# Patient Record
Sex: MALE | Race: WHITE | NOT HISPANIC OR LATINO | Employment: OTHER | URBAN - METROPOLITAN AREA
[De-identification: names, ages, dates, MRNs, and addresses within clinical notes are randomized per-mention and may not be internally consistent; named-entity substitution may affect disease eponyms.]

---

## 2019-02-17 ENCOUNTER — HOSPITAL ENCOUNTER (OUTPATIENT)
Dept: URGENT CARE | Facility: CLINIC | Age: 24
Discharge: HOME | End: 2019-02-17

## 2021-09-16 ENCOUNTER — HOSPITAL ENCOUNTER (EMERGENCY)
Facility: HOSPITAL | Age: 26
Discharge: HOME | End: 2021-09-16
Attending: EMERGENCY MEDICINE | Admitting: EMERGENCY MEDICINE

## 2021-09-16 VITALS
SYSTOLIC BLOOD PRESSURE: 137 MMHG | DIASTOLIC BLOOD PRESSURE: 73 MMHG | TEMPERATURE: 98.9 F | OXYGEN SATURATION: 96 % | BODY MASS INDEX: 34.59 KG/M2 | HEIGHT: 70 IN | HEART RATE: 99 BPM | WEIGHT: 241.62 LBS | RESPIRATION RATE: 20 BRPM

## 2021-09-16 DIAGNOSIS — K52.9 COLITIS WITH RECTAL BLEEDING: Primary | ICD-10-CM

## 2021-09-16 DIAGNOSIS — K62.5 COLITIS WITH RECTAL BLEEDING: Primary | ICD-10-CM

## 2021-09-16 LAB
ALBUMIN SERPL-MCNC: 4 G/DL (ref 3.5–5.2)
ALBUMIN/GLOB SERPL: 1.6 G/DL
ALP SERPL-CCNC: 60 U/L (ref 39–117)
ALT SERPL W P-5'-P-CCNC: 93 U/L (ref 1–41)
ANION GAP SERPL CALCULATED.3IONS-SCNC: 13.2 MMOL/L (ref 5–15)
AST SERPL-CCNC: 55 U/L (ref 1–40)
BASOPHILS # BLD AUTO: 0.03 10*3/MM3 (ref 0–0.2)
BASOPHILS NFR BLD AUTO: 0.4 % (ref 0–1.5)
BILIRUB SERPL-MCNC: 0.5 MG/DL (ref 0–1.2)
BILIRUB UR QL STRIP: NEGATIVE
BUN SERPL-MCNC: 9 MG/DL (ref 6–20)
BUN/CREAT SERPL: 8.7 (ref 7–25)
CALCIUM SPEC-SCNC: 8.6 MG/DL (ref 8.6–10.5)
CHLORIDE SERPL-SCNC: 102 MMOL/L (ref 98–107)
CLARITY UR: CLEAR
CO2 SERPL-SCNC: 21.8 MMOL/L (ref 22–29)
COLOR UR: ABNORMAL
CREAT SERPL-MCNC: 1.04 MG/DL (ref 0.76–1.27)
DEPRECATED RDW RBC AUTO: 38.1 FL (ref 37–54)
EOSINOPHIL # BLD AUTO: 0.38 10*3/MM3 (ref 0–0.4)
EOSINOPHIL NFR BLD AUTO: 4.5 % (ref 0.3–6.2)
ERYTHROCYTE [DISTWIDTH] IN BLOOD BY AUTOMATED COUNT: 12.7 % (ref 12.3–15.4)
GFR SERPL CREATININE-BSD FRML MDRD: 85 ML/MIN/1.73
GLOBULIN UR ELPH-MCNC: 2.5 GM/DL
GLUCOSE SERPL-MCNC: 90 MG/DL (ref 65–99)
GLUCOSE UR STRIP-MCNC: NEGATIVE MG/DL
HCT VFR BLD AUTO: 40.5 % (ref 37.5–51)
HGB BLD-MCNC: 14 G/DL (ref 13–17.7)
HGB UR QL STRIP.AUTO: NEGATIVE
HOLD SPECIMEN: NORMAL
HOLD SPECIMEN: NORMAL
IMM GRANULOCYTES # BLD AUTO: 0.14 10*3/MM3 (ref 0–0.05)
IMM GRANULOCYTES NFR BLD AUTO: 1.7 % (ref 0–0.5)
KETONES UR QL STRIP: ABNORMAL
LEUKOCYTE ESTERASE UR QL STRIP.AUTO: NEGATIVE
LIPASE SERPL-CCNC: 21 U/L (ref 13–60)
LYMPHOCYTES # BLD AUTO: 1.84 10*3/MM3 (ref 0.7–3.1)
LYMPHOCYTES NFR BLD AUTO: 22 % (ref 19.6–45.3)
MCH RBC QN AUTO: 28.5 PG (ref 26.6–33)
MCHC RBC AUTO-ENTMCNC: 34.6 G/DL (ref 31.5–35.7)
MCV RBC AUTO: 82.3 FL (ref 79–97)
MONOCYTES # BLD AUTO: 1.04 10*3/MM3 (ref 0.1–0.9)
MONOCYTES NFR BLD AUTO: 12.4 % (ref 5–12)
NEUTROPHILS NFR BLD AUTO: 4.93 10*3/MM3 (ref 1.7–7)
NEUTROPHILS NFR BLD AUTO: 59 % (ref 42.7–76)
NITRITE UR QL STRIP: NEGATIVE
NRBC BLD AUTO-RTO: 0 /100 WBC (ref 0–0.2)
PH UR STRIP.AUTO: 6 [PH] (ref 5–8)
PLATELET # BLD AUTO: 270 10*3/MM3 (ref 140–450)
PMV BLD AUTO: 9.2 FL (ref 6–12)
POTASSIUM SERPL-SCNC: 3.5 MMOL/L (ref 3.5–5.2)
PROT SERPL-MCNC: 6.5 G/DL (ref 6–8.5)
PROT UR QL STRIP: ABNORMAL
RBC # BLD AUTO: 4.92 10*6/MM3 (ref 4.14–5.8)
SODIUM SERPL-SCNC: 137 MMOL/L (ref 136–145)
SP GR UR STRIP: 1.03 (ref 1–1.03)
UROBILINOGEN UR QL STRIP: ABNORMAL
WBC # BLD AUTO: 8.36 10*3/MM3 (ref 3.4–10.8)
WHOLE BLOOD HOLD SPECIMEN: NORMAL
WHOLE BLOOD HOLD SPECIMEN: NORMAL

## 2021-09-16 PROCEDURE — 85025 COMPLETE CBC W/AUTO DIFF WBC: CPT | Performed by: EMERGENCY MEDICINE

## 2021-09-16 PROCEDURE — 80053 COMPREHEN METABOLIC PANEL: CPT | Performed by: EMERGENCY MEDICINE

## 2021-09-16 PROCEDURE — 99283 EMERGENCY DEPT VISIT LOW MDM: CPT

## 2021-09-16 PROCEDURE — 83690 ASSAY OF LIPASE: CPT | Performed by: EMERGENCY MEDICINE

## 2021-09-16 PROCEDURE — 36415 COLL VENOUS BLD VENIPUNCTURE: CPT

## 2021-09-16 PROCEDURE — 81003 URINALYSIS AUTO W/O SCOPE: CPT | Performed by: EMERGENCY MEDICINE

## 2021-09-16 RX ORDER — CIPROFLOXACIN 500 MG/1
500 TABLET, FILM COATED ORAL 2 TIMES DAILY
Qty: 20 TABLET | Refills: 0 | Status: SHIPPED | OUTPATIENT
Start: 2021-09-16 | End: 2023-03-29

## 2021-09-16 RX ORDER — DICYCLOMINE HCL 20 MG
20 TABLET ORAL EVERY 6 HOURS
Qty: 20 TABLET | Refills: 0 | Status: SHIPPED | OUTPATIENT
Start: 2021-09-16 | End: 2023-03-29 | Stop reason: ALTCHOICE

## 2021-09-16 RX ORDER — PREDNISONE 20 MG/1
20 TABLET ORAL DAILY
Qty: 5 TABLET | Refills: 0 | Status: SHIPPED | OUTPATIENT
Start: 2021-09-16 | End: 2023-03-29

## 2021-09-16 RX ORDER — SODIUM CHLORIDE 0.9 % (FLUSH) 0.9 %
10 SYRINGE (ML) INJECTION AS NEEDED
Status: DISCONTINUED | OUTPATIENT
Start: 2021-09-16 | End: 2021-09-16 | Stop reason: HOSPADM

## 2021-09-16 NOTE — DISCHARGE INSTRUCTIONS
Continue Flagyl. If bleeding persists after course of medications please make appointment with gastroenterology for possible colonoscopy. Return to the ER for dizziness, shortness of air, palpitations, fever, increased pain or any concerns.

## 2021-09-16 NOTE — ED PROVIDER NOTES
Subjective   Pt reports he was seen by PCP yesterday for same issue of abdominal pain and rectal bleeding. A CT scan was performed at [facility] which pt reports showed diffuse colitis. He was given 2 liters of IVF and prescribed Flagyl. Today he has increased blood in diarrhea and his PCP advised he go to an ER for evaluation. Pt denies dizziness, shortness of breath. Reports no increase in abdominal pain, just increased bleeding. Pt also had recent illness with fatigue and was tested twice for COVID and each was negative.       History provided by:  Patient  Rectal Bleeding  Quality:  Bright red  Amount:  Moderate  Duration:  2 days  Timing:  Constant  Chronicity:  New  Context: diarrhea and spontaneously    Context: not anal fissures, not anal penetration, not constipation, not defecation, not foreign body, not hemorrhoids, not rectal injury and not rectal pain    Relieved by:  Nothing  Worsened by:  Nothing  Ineffective treatments: immodium.  Associated symptoms: abdominal pain and recent illness    Associated symptoms: no dizziness, no epistaxis, no fever, no hematemesis, no light-headedness, no loss of consciousness and no vomiting        Review of Systems   Constitutional: Negative for chills and fever.   HENT: Negative for congestion, ear pain, nosebleeds and sore throat.    Eyes: Negative for pain.   Respiratory: Negative for cough, chest tightness and shortness of breath.    Cardiovascular: Negative for chest pain.   Gastrointestinal: Positive for abdominal pain, blood in stool, diarrhea and hematochezia. Negative for abdominal distention, anal bleeding, constipation, hematemesis, nausea, rectal pain and vomiting.   Genitourinary: Negative for flank pain and hematuria.   Musculoskeletal: Negative for joint swelling.   Skin: Negative for pallor.   Neurological: Negative for dizziness, seizures, loss of consciousness, light-headedness and headaches.   All other systems reviewed and are  negative.      Past Medical History:   Diagnosis Date   • Allergies    • Anxiety    • Disease of thyroid gland    • Hypertension        Allergies   Allergen Reactions   • Vancomycin Other (See Comments)     Joint pain   • Augmentin [Amoxicillin-Pot Clavulanate] Rash       No past surgical history on file.    Family History   Problem Relation Age of Onset   • No Known Problems Mother    • Cancer Father        Social History     Socioeconomic History   • Marital status: Single     Spouse name: Not on file   • Number of children: Not on file   • Years of education: Not on file   • Highest education level: Not on file   Tobacco Use   • Smoking status: Never Smoker   • Smokeless tobacco: Never Used   Vaping Use   • Vaping Use: Never used   Substance and Sexual Activity   • Alcohol use: Not Currently     Comment: SOCIAL    • Drug use: Never   • Sexual activity: Defer           Objective   Physical Exam  Vitals and nursing note reviewed.   Constitutional:       General: He is not in acute distress.     Appearance: Normal appearance. He is not toxic-appearing.   HENT:      Head: Normocephalic and atraumatic.      Mouth/Throat:      Mouth: Mucous membranes are moist.   Eyes:      Extraocular Movements: Extraocular movements intact.      Pupils: Pupils are equal, round, and reactive to light.   Cardiovascular:      Rate and Rhythm: Normal rate and regular rhythm.      Pulses: Normal pulses.      Heart sounds: Normal heart sounds.   Pulmonary:      Effort: Pulmonary effort is normal. No respiratory distress.      Breath sounds: Normal breath sounds.   Abdominal:      General: Abdomen is flat.      Palpations: Abdomen is soft.      Tenderness: There is no abdominal tenderness.   Musculoskeletal:         General: Normal range of motion.      Cervical back: Normal range of motion and neck supple.   Skin:     General: Skin is warm and dry.   Neurological:      Mental Status: He is alert and oriented to person, place, and time.  Mental status is at baseline.         Procedures           ED Course                                           MDM  Number of Diagnoses or Management Options  Colitis with rectal bleeding: established and worsening  Diagnosis management comments: The patient is resting comfortably and feels better, is alert and in no distress. Repeat examination is unremarkable and benign; in particular, there's no discomfort at McBurney's point and there is no pulsatile mass. The patient has passed a po challenge in the ED and has no intractable vomiting. The history, exam, diagnostic testing, and current condition does not suggest acute appendicitis, bowel instruction, acute cholecystitis, bowel perforation, major gastrointestinal bleeding, severe diverticulitis, abdominal aortic aneurysm, mesenteric ischemia, volvulus, sepsis, or other significant pathology that warrants further testing, continued ED treatment, admission, for surgical evaluation at this point. The vital signs have been stable. The patient´s symptoms are consistent with gastritis. The patient has no peritoneal signs consistent with a perforated ulcer. The patient was counseled to avoid NSAIDS, coffee, spicy foods, alcohol, smoking and other irritants of the stomach. The patient was advised that they may seek  of a gastroenterologist for an outpatient endoscopy. The patient does not have uncontrollable pain, intractable vomiting, or other significant symptoms. The patient's condition is stable and appropriate for discharge from the emergency department.       Amount and/or Complexity of Data Reviewed  Clinical lab tests: reviewed and ordered    Risk of Complications, Morbidity, and/or Mortality  Presenting problems: low  Diagnostic procedures: low  Management options: low    Patient Progress  Patient progress: stable      Final diagnoses:   Colitis with rectal bleeding       ED Disposition  ED Disposition     ED Disposition Condition Comment    Discharge  Madai Case, DO  [address] [facility] [place] [address]  [phone]    In 3 days  As needed    Sean Quan MD  [address] [place] [address] [phone]    Schedule an appointment as soon as possible for a visit in 1 week           Medication List      New Prescriptions    ciprofloxacin 500 MG tablet  Commonly known as: CIPRO  Take 1 tablet by mouth 2 (Two) Times a Day.     dicyclomine 20 MG tablet  Commonly known as: BENTYL  Take 1 tablet by mouth Every 6 (Six) Hours.     predniSONE 20 MG tablet  Commonly known as: DELTASONE  Take 1 tablet by mouth Daily.           Where to Get Your Medications      These medications were sent to [id number] - [place], [place] - [address] AT [facility] - [phone]  - [phone] FX  [address] [place] [address]    Hours: 24-hours Phone: [phone]   · ciprofloxacin 500 MG tablet  · dicyclomine 20 MG tablet  · predniSONE 20 MG tablet          Kris Ceballos, APRN  09/17/21 0003

## 2022-12-29 ENCOUNTER — TRANSCRIBE ORDERS (OUTPATIENT)
Dept: LAB | Facility: HOSPITAL | Age: 27
End: 2022-12-29
Payer: COMMERCIAL

## 2022-12-29 DIAGNOSIS — R73.9 BLOOD GLUCOSE ELEVATED: ICD-10-CM

## 2022-12-29 DIAGNOSIS — R53.83 TIREDNESS: ICD-10-CM

## 2022-12-29 DIAGNOSIS — E55.9 AVITAMINOSIS D: Primary | ICD-10-CM

## 2022-12-29 DIAGNOSIS — I10 ESSENTIAL HYPERTENSION, MALIGNANT: ICD-10-CM

## 2023-01-13 ENCOUNTER — LAB (OUTPATIENT)
Dept: LAB | Facility: HOSPITAL | Age: 28
End: 2023-01-13
Payer: COMMERCIAL

## 2023-01-13 DIAGNOSIS — R73.9 BLOOD GLUCOSE ELEVATED: ICD-10-CM

## 2023-01-13 DIAGNOSIS — E55.9 AVITAMINOSIS D: ICD-10-CM

## 2023-01-13 DIAGNOSIS — I10 ESSENTIAL HYPERTENSION, MALIGNANT: ICD-10-CM

## 2023-01-13 DIAGNOSIS — R53.83 TIREDNESS: ICD-10-CM

## 2023-01-13 LAB
25(OH)D3 SERPL-MCNC: 25.9 NG/ML (ref 30–100)
ALBUMIN SERPL-MCNC: 4.8 G/DL (ref 3.5–5.2)
ALBUMIN UR-MCNC: <1.2 MG/DL
ALBUMIN/GLOB SERPL: 1.9 G/DL
ALP SERPL-CCNC: 67 U/L (ref 39–117)
ALT SERPL W P-5'-P-CCNC: 80 U/L (ref 1–41)
ANION GAP SERPL CALCULATED.3IONS-SCNC: 10.8 MMOL/L (ref 5–15)
AST SERPL-CCNC: 36 U/L (ref 1–40)
BASOPHILS # BLD AUTO: 0.08 10*3/MM3 (ref 0–0.2)
BASOPHILS NFR BLD AUTO: 1.2 % (ref 0–1.5)
BILIRUB SERPL-MCNC: 0.4 MG/DL (ref 0–1.2)
BILIRUB UR QL STRIP: NEGATIVE
BUN SERPL-MCNC: 17 MG/DL (ref 6–20)
BUN/CREAT SERPL: 17.2 (ref 7–25)
CALCIUM SPEC-SCNC: 9.1 MG/DL (ref 8.6–10.5)
CHLORIDE SERPL-SCNC: 100 MMOL/L (ref 98–107)
CHOLEST SERPL-MCNC: 210 MG/DL (ref 0–200)
CLARITY UR: CLEAR
CO2 SERPL-SCNC: 24.2 MMOL/L (ref 22–29)
COLOR UR: YELLOW
CORTIS AM PEAK SERPL-MCNC: 7.23 MCG/DL (ref 6.02–18.4)
CREAT SERPL-MCNC: 0.99 MG/DL (ref 0.76–1.27)
DEPRECATED RDW RBC AUTO: 41.1 FL (ref 37–54)
EGFRCR SERPLBLD CKD-EPI 2021: 105.8 ML/MIN/1.73
EOSINOPHIL # BLD AUTO: 0.28 10*3/MM3 (ref 0–0.4)
EOSINOPHIL NFR BLD AUTO: 4.2 % (ref 0.3–6.2)
ERYTHROCYTE [DISTWIDTH] IN BLOOD BY AUTOMATED COUNT: 13.6 % (ref 12.3–15.4)
FOLATE SERPL-MCNC: 3.96 NG/ML (ref 4.78–24.2)
GLOBULIN UR ELPH-MCNC: 2.5 GM/DL
GLUCOSE SERPL-MCNC: 105 MG/DL (ref 65–99)
GLUCOSE UR STRIP-MCNC: NEGATIVE MG/DL
HBA1C MFR BLD: 5.8 % (ref 4.8–5.6)
HCT VFR BLD AUTO: 42.9 % (ref 37.5–51)
HDLC SERPL-MCNC: 24 MG/DL (ref 40–60)
HGB BLD-MCNC: 14.8 G/DL (ref 13–17.7)
HGB UR QL STRIP.AUTO: NEGATIVE
IMM GRANULOCYTES # BLD AUTO: 0.04 10*3/MM3 (ref 0–0.05)
IMM GRANULOCYTES NFR BLD AUTO: 0.6 % (ref 0–0.5)
KETONES UR QL STRIP: NEGATIVE
LDLC SERPL CALC-MCNC: 81 MG/DL (ref 0–100)
LDLC/HDLC SERPL: 2.35 {RATIO}
LEUKOCYTE ESTERASE UR QL STRIP.AUTO: NEGATIVE
LYMPHOCYTES # BLD AUTO: 2.34 10*3/MM3 (ref 0.7–3.1)
LYMPHOCYTES NFR BLD AUTO: 35 % (ref 19.6–45.3)
MCH RBC QN AUTO: 28.8 PG (ref 26.6–33)
MCHC RBC AUTO-ENTMCNC: 34.5 G/DL (ref 31.5–35.7)
MCV RBC AUTO: 83.5 FL (ref 79–97)
MONOCYTES # BLD AUTO: 0.58 10*3/MM3 (ref 0.1–0.9)
MONOCYTES NFR BLD AUTO: 8.7 % (ref 5–12)
NEUTROPHILS NFR BLD AUTO: 3.37 10*3/MM3 (ref 1.7–7)
NEUTROPHILS NFR BLD AUTO: 50.3 % (ref 42.7–76)
NITRITE UR QL STRIP: NEGATIVE
NRBC BLD AUTO-RTO: 0 /100 WBC (ref 0–0.2)
PH UR STRIP.AUTO: 5.5 [PH] (ref 5–8)
PLATELET # BLD AUTO: 253 10*3/MM3 (ref 140–450)
PMV BLD AUTO: 10.6 FL (ref 6–12)
POTASSIUM SERPL-SCNC: 4.4 MMOL/L (ref 3.5–5.2)
PROT SERPL-MCNC: 7.3 G/DL (ref 6–8.5)
PROT UR QL STRIP: NEGATIVE
RBC # BLD AUTO: 5.14 10*6/MM3 (ref 4.14–5.8)
SODIUM SERPL-SCNC: 135 MMOL/L (ref 136–145)
SP GR UR STRIP: 1.02 (ref 1–1.03)
TRIGL SERPL-MCNC: 648 MG/DL (ref 0–150)
TSH SERPL DL<=0.05 MIU/L-ACNC: 4.88 UIU/ML (ref 0.27–4.2)
UROBILINOGEN UR QL STRIP: NORMAL
VIT B12 BLD-MCNC: 687 PG/ML (ref 211–946)
VLDLC SERPL-MCNC: 105 MG/DL (ref 5–40)
WBC NRBC COR # BLD: 6.69 10*3/MM3 (ref 3.4–10.8)

## 2023-01-13 PROCEDURE — 82627 DEHYDROEPIANDROSTERONE: CPT

## 2023-01-13 PROCEDURE — 81003 URINALYSIS AUTO W/O SCOPE: CPT

## 2023-01-13 PROCEDURE — 82043 UR ALBUMIN QUANTITATIVE: CPT

## 2023-01-13 PROCEDURE — 83036 HEMOGLOBIN GLYCOSYLATED A1C: CPT

## 2023-01-13 PROCEDURE — 82607 VITAMIN B-12: CPT

## 2023-01-13 PROCEDURE — 80061 LIPID PANEL: CPT

## 2023-01-13 PROCEDURE — 80050 GENERAL HEALTH PANEL: CPT

## 2023-01-13 PROCEDURE — 84403 ASSAY OF TOTAL TESTOSTERONE: CPT

## 2023-01-13 PROCEDURE — 82533 TOTAL CORTISOL: CPT

## 2023-01-13 PROCEDURE — 84402 ASSAY OF FREE TESTOSTERONE: CPT

## 2023-01-13 PROCEDURE — 82746 ASSAY OF FOLIC ACID SERUM: CPT

## 2023-01-13 PROCEDURE — 36415 COLL VENOUS BLD VENIPUNCTURE: CPT

## 2023-01-13 PROCEDURE — 82306 VITAMIN D 25 HYDROXY: CPT

## 2023-01-14 LAB — DHEA-S SERPL-MCNC: 344 UG/DL (ref 138.5–475.2)

## 2023-01-18 LAB
TESTOST FREE SERPL-MCNC: 5.9 PG/ML (ref 9.3–26.5)
TESTOST SERPL-MCNC: 126 NG/DL (ref 264–916)

## 2023-03-29 ENCOUNTER — OFFICE VISIT (OUTPATIENT)
Dept: SLEEP MEDICINE | Facility: HOSPITAL | Age: 28
End: 2023-03-29
Payer: COMMERCIAL

## 2023-03-29 VITALS
HEART RATE: 74 BPM | OXYGEN SATURATION: 96 % | BODY MASS INDEX: 36.28 KG/M2 | WEIGHT: 253.4 LBS | DIASTOLIC BLOOD PRESSURE: 74 MMHG | HEIGHT: 70 IN | SYSTOLIC BLOOD PRESSURE: 131 MMHG

## 2023-03-29 DIAGNOSIS — G47.8 NON-RESTORATIVE SLEEP: ICD-10-CM

## 2023-03-29 DIAGNOSIS — E66.9 CLASS 2 OBESITY: ICD-10-CM

## 2023-03-29 DIAGNOSIS — G47.30 OBSERVED SLEEP APNEA: Primary | ICD-10-CM

## 2023-03-29 DIAGNOSIS — R06.83 SNORING: ICD-10-CM

## 2023-03-29 DIAGNOSIS — G47.10 HYPERSOMNIA: ICD-10-CM

## 2023-03-29 PROBLEM — E66.812 CLASS 2 OBESITY: Status: ACTIVE | Noted: 2023-03-29

## 2023-03-29 PROCEDURE — G0463 HOSPITAL OUTPT CLINIC VISIT: HCPCS

## 2023-03-29 PROCEDURE — 99204 OFFICE O/P NEW MOD 45 MIN: CPT | Performed by: INTERNAL MEDICINE

## 2023-03-29 RX ORDER — CLOMIPHENE CITRATE 50 MG/1
TABLET ORAL
COMMUNITY
Start: 2023-02-15

## 2023-03-29 RX ORDER — ICOSAPENT ETHYL 1000 MG/1
CAPSULE ORAL
COMMUNITY
Start: 2023-01-16

## 2023-03-29 RX ORDER — SEMAGLUTIDE 1.34 MG/ML
INJECTION, SOLUTION SUBCUTANEOUS
COMMUNITY
Start: 2023-02-01

## 2023-03-29 RX ORDER — LEVOTHYROXINE SODIUM 0.03 MG/1
TABLET ORAL
COMMUNITY
Start: 2023-01-16

## 2023-03-29 NOTE — PROGRESS NOTES
[facility]  [address] [place] [address]  [place]   [place] [address]  Phone: [phone]  Fax: [phone]      Tae Mathews  [id number]   1995  27 y.o.  male      Referring physician/provider and PCP Madai Quinones DO    Type of service: Initial Sleep Medicine Consult.  Date of service: 3/29/2023      No chief complaint on file.      History of present illness;  Thank you for asking to see Tae Mathews, 27 y.o.. The patient was seen today on 3/29/2023 at [facility].  The patient presents today with symptoms of snoring, non-restorative sleep and witnessed apneas. The symptoms are present for 3 to 4 years and they are persistent in nature.  The snoring is present in all positions and it is loud.  Patient has no prior surgery namely tonsillectomy, nasal surgery and UPPP.     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    Patient gives the following sleep history.  Sleep schedule:  Bedtime: 11 PM  Wake time: 7 AM  Normally takes about 30-60 minutes to fall asleep  Average hours of sleep 8  Number of naps per day 1  Symptoms   In addition to snoring, nonrestorative sleep and witnessed apneas patient gives the following associated symptoms.  Have you ever awakened gasping for breath, coughing, choking: Yes   Change in weight,  Yes gained 40 pounds  Morning headaches  No   Awaken with a sore throat or dry mouth  Yes   Leg jerking at night:  No   Crawly feeling/urge sensation to move in the legs: No   Teeth grinding:No   Have you ever awakened at night with a sour taste or burning sensation in your chest:  No   Do you have muscle weakness with laughing or anger or sleep paralysis:  No   Have you ever felt paralyzed while going to sleep or waking up:  No   Sleepwalking, nightmares, No   Nocturia (urination at night): 0 times per night  Memory Problem:No     MEDICAL CONDITIONS (PMH)   1. Hypertension  2. Diabetes  3. Hypothyroidism    Social history:  Do you drive a  "commercial vehicle:  No   Shift work:  No   Tobacco use:  No   Alcohol use: 0 per week  Caffeinated drinks: 2    Family Hx (parents and siblings) (pertaining to sleep medicine)  1. Thyroid disorder  2. Obesity    Medications: reviewed    Review of systems:  Positive symptoms are :  • Snoring  • Witnessed apnea  • Daytime excessive sleepiness with [place] Sleepiness Scale of Total score: 14   • Fatigue  • Nasal congestion  • Recurrent heartburn      Physical exam:  CONSTITUTINONAL:  Vitals:    03/29/23 0900   BP: 131/74   Pulse: 74   SpO2: 96%   Weight: 115 kg (253 lb 6.4 oz)   Height: 177.8 cm (70\")    Body mass index is 36.36 kg/m².   NOSE:no nasal septal defects, nasal passages are clear, no nasal polyps,   THROAT: tonsils are nonenlarged, tongue normal size, oral airway Mallampati class 3  NECK:Neck Circumference: 16 inches, trachea is in the midline, thyroid not enlarged  RESPIRATORY SYSTEM: Breath sounds are normal, there are no wheezes  CARDIOVASULAR SYSTEM: Heart sounds are regular rhythm and normal rate, no edema  NEUROLOGICAL SYSTEM: Oriented x 3, No speech defect, gait is normal  PSYCHIATRIC SYSTEM: Mood is normal, thought content is normal      Labs reviewed.  TSH Results:  TSH    TSH 1/13/23   TSH 4.880 (A)   (A) Abnormal value             Most Recent A1C    HGBA1C Most Recent 1/13/23   Hemoglobin A1C 5.80 (A)   (A) Abnormal value               Assessment and plan:  · Witnessed apneas,(R06.81) patient's symptoms and examination is consistent with sleep apnea (G47.30). I have talked to the patient about the signs and symptoms of sleep apnea. In addition, I have also discussed pathophysiology of sleep apnea.  I also discussed the complications of untreated sleep apnea including effects on hypertension, diabetes mellitus and nonrestorative sleep with hypersomnia which can increase risk for motor vehicle accidents.  Untreated sleep apnea is also a risk factor for development of atrial fibrillation, pulmonary " hypertension, and insulin resistance and stroke.  Discussed in detail of various testing methods including home-based and lab based sleep studies.  Based on history and physical examination and other comorbidities the most appropriate study is home sleep test.  The order for the sleep study is placed in [facility].  The test will be scheduled after approval from insurance. Treatment and management will be discussed after the test is completed.  Patient was given opportunity to ask questions and all the questions were answered.   · Snoring (R06.83), snoring is the sound created by turbulent airflow vibrating upper airway soft tissue due to limitation of inspiratory airflow. I have also discussed factors affecting snoring including sleep deprivation, sleeping on the back and alcohol ingestion. To minimize snoring, patient is advised to have adequate sleep, sleep on the side and avoid alcohol and sedative medications before bedtime  · Daytime excessive sleepiness .  It was assessed with [place] Sleepiness Scale of Total score: 14.  There are many causes for daytime excessive sleepiness including sleep depression, shiftwork syndrome, depression and other medical disorders including heart, kidney and liver failure.  The most serious cause of excessive sleepiness is due to neurological conditions like narcolepsy/cataplexy.  But the most common cause of excessive sleepiness is due to sleep apnea with frequent awakenings during sleep time.  I have discussed safety of driving and to remain vigilant while driving.  · Obesity 2, patient's BMI is Body mass index is 36.36 kg/m².. I have discussed the relationship between weight and sleep apnea.There is direct correlation between weight and severity of sleep apnea.  Weight reduction is encouraged, as it is going to reduce the severity of sleep apnea. I have also discussed with the patient diet and exercise to achieve ideal body weight.  · Hypertension    I have also discussed with the  patient the following  • Sleep hygiene: Maintaining a regular bedtime and wake time, not to watch television or work in bed, limit caffeine-containing beverages before bed time and avoid naps during the day  • Adequate amount of sleep.  Generally most people needs about 7 to 8 hours of sleep.    Return for 31 to 90 days after PAP setup with down load..  Patient's questions were answered      I once again thank you for asking me to see this patient in consultation and I have forwarded my opinion and treatment plan.  Please do not hesitate to call me if you have any questions.   3/29/2023  Autumn Hinson MD  Sleep Medicine  Medical Director  [facility]: [facility]

## 2023-04-03 ENCOUNTER — TRANSCRIBE ORDERS (OUTPATIENT)
Dept: LAB | Facility: HOSPITAL | Age: 28
End: 2023-04-03
Payer: COMMERCIAL

## 2023-04-03 DIAGNOSIS — I10 ESSENTIAL (PRIMARY) HYPERTENSION: ICD-10-CM

## 2023-04-03 DIAGNOSIS — R73.9 HYPERGLYCEMIA: Primary | ICD-10-CM

## 2023-04-03 DIAGNOSIS — E55.9 VITAMIN D DEFICIENCY: ICD-10-CM

## 2023-04-03 DIAGNOSIS — R53.83 OTHER FATIGUE: ICD-10-CM

## 2023-05-02 DIAGNOSIS — G47.30 OBSERVED SLEEP APNEA: Primary | ICD-10-CM

## 2023-05-02 DIAGNOSIS — E66.9 CLASS 2 OBESITY: ICD-10-CM

## 2023-05-02 DIAGNOSIS — G47.10 HYPERSOMNIA: ICD-10-CM

## 2023-05-02 DIAGNOSIS — R06.83 SNORING: ICD-10-CM

## 2023-05-02 DIAGNOSIS — G47.8 NON-RESTORATIVE SLEEP: ICD-10-CM

## 2023-05-16 ENCOUNTER — TRANSCRIBE ORDERS (OUTPATIENT)
Dept: LAB | Facility: HOSPITAL | Age: 28
End: 2023-05-16
Payer: COMMERCIAL

## 2023-05-16 DIAGNOSIS — E29.1 3-OXO-5 ALPHA-STEROID DELTA 4-DEHYDROGENASE DEFICIENCY: Primary | ICD-10-CM

## 2023-05-19 ENCOUNTER — OFFICE VISIT (OUTPATIENT)
Dept: PSYCHIATRY | Facility: CLINIC | Age: 28
End: 2023-05-19
Payer: COMMERCIAL

## 2023-05-19 VITALS
DIASTOLIC BLOOD PRESSURE: 75 MMHG | HEIGHT: 70 IN | WEIGHT: 250.2 LBS | BODY MASS INDEX: 35.82 KG/M2 | HEART RATE: 76 BPM | SYSTOLIC BLOOD PRESSURE: 144 MMHG

## 2023-05-19 DIAGNOSIS — F33.1 MAJOR DEPRESSIVE DISORDER, RECURRENT EPISODE, MODERATE: Primary | ICD-10-CM

## 2023-05-19 DIAGNOSIS — F41.1 GENERALIZED ANXIETY DISORDER: ICD-10-CM

## 2023-05-19 PROBLEM — F41.9 ANXIETY: Status: ACTIVE | Noted: 2020-09-01

## 2023-05-19 PROBLEM — E78.5 HYPERLIPIDEMIA: Status: ACTIVE | Noted: 2022-12-28

## 2023-05-19 RX ORDER — PHENTERMINE HYDROCHLORIDE 15 MG/1
CAPSULE ORAL EVERY 24 HOURS
COMMUNITY

## 2023-05-19 RX ORDER — LEVOTHYROXINE SODIUM 0.2 MG/1
TABLET ORAL EVERY 24 HOURS
COMMUNITY

## 2023-05-19 RX ORDER — CITALOPRAM 20 MG/1
30 TABLET ORAL DAILY
Qty: 45 TABLET | Refills: 2 | Status: SHIPPED | OUTPATIENT
Start: 2023-05-19 | End: 2023-08-17

## 2023-05-19 NOTE — PROGRESS NOTES
"Subjective   Tae Mathews is a 27 y.o. male who presents today for initial evaluation   This provider is located at [address] [place] [address], [address] in [place], [place]. In-person visit consisting of the patient and I only. The patient is accepting of and agreeable to this appointment.       Chief Complaint:  Depression, anxiety    History of Present Illness: Depression: onset October 2020 (COVID, recently , moved), increased early 2023 (\"amount of stress I put on myself\")  Depressed mood: endorses  Markedly diminished interest or pleasure: endorses  Significant weight loss when not dieting or weight gain, or decrease or increase in appetite: n  Insomnia or hypersomnia: endorses insomnia  Psychomotor agitation or retardation: n  Fatigue or loss of energy: endorses   Feelings of worthlessness or excessive or inappropriate guilt: endorses   Diminished ability to think or concentrate, or indecisiveness: endorses  Recurrent thoughts of death, recurrent suicidal ideation without a specific plan, or suicide attempt or specific plan for committing suicide- denies    Anxiety: Onset October 2020, increased early 2023 (see above)  Anxious, nervous or worried on most days about a number of events or activities- endorses   No control over worries- endorses. \"ruminating. Doesn't take long to find a through z worst in things\"- working on positive coping skills  Irritability- denies  Fatigue: see above  Difficulty concentrating- see above  Sleep disturbance- see above  Restlessness- denies  Tension in muscles- denies    Psychiatric Review of Systems: Patient denies any current or previous hallucinations/delusions, paranoia, manic symptoms or PTSD.     PHQ-9 Depression Screening  PHQ-9 Total Score:      Little interest or pleasure in doing things?     Feeling down, depressed, or hopeless?     Trouble falling or staying asleep, or sleeping too much?     Feeling tired or having little energy?     Poor appetite or " overeating?     Feeling bad about yourself - or that you are a failure or have let yourself or your family down?     Trouble concentrating on things, such as reading the newspaper or watching television?     Moving or speaking so slowly that other people could have noticed? Or the opposite - being so fidgety or restless that you have been moving around a lot more than usual?     Thoughts that you would be better off dead, or of hurting yourself in some way?     PHQ-9 Total Score       PEPE-7  Feeling nervous, anxious or on edge: Nearly every day  Not being able to stop or control worrying: More than half the days  Worrying too much about different things: Nearly every day  Trouble Relaxing: Several days  Being so restless that it is hard to sit still: Not at all  Feeling afraid as if something awful might happen: More than half the days  Becoming easily annoyed or irritable: Several days  PEPE 7 Total Score: 12  If you checked any problems, how difficult have these problems made it for you to do your work, take care of things at home, or get along with other people: Somewhat difficult      Past Surgical History:  Past Surgical History:   Procedure Laterality Date   • COLONOSCOPY  11/03/2021       Problem List:  Patient Active Problem List   Diagnosis   • Observed sleep apnea   • Snoring   • Class 2 obesity   • Non-restorative sleep   • Hypersomnia   • Anxiety   • Hyperlipidemia   • High blood pressure   • Hypothyroidism       Allergy:   Allergies   Allergen Reactions   • Vancomycin Other (See Comments)     Joint pain   • Augmentin [Amoxicillin-Pot Clavulanate] Rash        Discontinued Medications:  Medications Discontinued During This Encounter   Medication Reason   • citalopram (CeleXA) 10 MG tablet Reorder       Current Medications:   Current Outpatient Medications   Medication Sig Dispense Refill   • citalopram (CeleXA) 20 MG tablet Take 1.5 tablets by mouth Daily for 90 days. 45 tablet 2   • cetirizine (zyrTEC) 10  MG tablet Take 1 tablet by mouth Daily.     • clomiPHENE (Clomid) 50 MG tablet      • levothyroxine (SYNTHROID, LEVOTHROID) 200 MCG tablet Take 1 tablet by mouth Daily.     • levothyroxine (SYNTHROID, LEVOTHROID) 200 MCG tablet Daily.     • levothyroxine (SYNTHROID, LEVOTHROID) 25 MCG tablet      • lisinopril (PRINIVIL,ZESTRIL) 20 MG tablet Take 1 tablet by mouth Daily.     • phentermine 15 MG capsule Daily.     • Semaglutide,0.25 or 0.5MG/DOS, (Ozempic, 0.25 or 0.5 MG/DOSE,) 2 MG/1.5ML solution pen-injector      • Vascepa 1 g capsule capsule        No current facility-administered medications for this visit.       Past Medical History:  Past Medical History:   Diagnosis Date   • Acid reflux    • Allergies    • Anxiety    • Depression    • Disease of thyroid gland    • Hypertension        Past Psychiatric History:  Began Treatment: 2020  Diagnoses: Depression, anxiety  Psychiatrist: Lópezies  Therapist: Dr. Phelps  Admission History: Denies  Medication Trials: Lexapro  Self Harm: Denies  Suicide Attempts: Denies    Substance Abuse History:   Types: Denies  Withdrawal Symptoms: Not applicable  Longest Period Sober: Not applicable  AA: Not applicable    Social History:  Martial Status:  (three years). [place]  Employed: [employer]  Kids: Denies  House: with wife   History: Denies    Social History     Socioeconomic History   • Marital status:    Tobacco Use   • Smoking status: Never   • Smokeless tobacco: Never   Vaping Use   • Vaping Use: Never used   Substance and Sexual Activity   • Alcohol use: Not Currently     Comment: SOCIAL    • Drug use: Never   • Sexual activity: Yes     Partners: Female       Family History:   Suicide Attempts: Denies  Suicide Completions: Denies      Family History   Problem Relation Age of Onset   • Anxiety disorder Mother    • Depression Mother    • Cancer Father    • Suicide Attempts Maternal Grandfather         committed suicide, 's  "Day 2020   • Anxiety disorder Maternal Grandmother    • Dementia Maternal Grandmother    • Depression Maternal Grandmother    • Seizures Maternal Grandmother        Developmental History:   Born: [place]  Siblings: Multiple  Childhood: Denies  High School: Graduate  College: Graduate    Access to Firearms: Denies    Mental Status Exam:     Appearance: good eye contact, normal street clothes, groomed, sitting in chair   Behavior: pleasant and cooperative  Motor: no abnormal  Speech: normal rhythm, rate, volume, tone, not hyperverbal, not pressured, normal prosidy  Mood: \"Okay\"  Affect: euthymic  Thought Content: negative suicidal ideations, negative homicidal ideations, negative obsessions  Perceptions: negative auditory hallucinations, negative visual hallucinations, negative delusions, negative paranoia  Thought Process: goal directed, linear  Insight/Judgement: fair/fair  Cognition: grossly intact  Attention: intact  Orientation: person, place, time and situation  Memory: intact    Review of Systems:     Constitutional: Denies fatigue, night sweats  Eyes: Denies double vision, blurred vision  HENT: Denies vertigo, recent head injury  Cardiovascular: Denies chest pain, irregular heartbeats  Respiratory: Denies productive cough, shortness of breath  Gastrointestinal: Denies nausea, vomiting  Genitourinary: Denies dysuria, urinary retention  Integument: Denies hair growth change, new skin lesions  Neurologic: Denies altered mental status, seizures  Musculoskeletal: Denies joint swelling, limitation of motion  Endocrine: Denies cold intolerance, heat intolerance  Psychiatric: Admits anxiety, depression. Denies yesy, post-traumatic stress disorder, obsessive compulsive disorder, psychosis.   Allergic-immunologic: Denies frequent illnesses      Vital Signs:   /75   Pulse 76   Ht 177.8 cm (70\")   Wt 113 kg (250 lb 3.2 oz)   BMI 35.90 kg/m²      Lab Results:   Lab on 01/13/2023   Component Date Value Ref Range " Status   • 25 Hydroxy, Vitamin D 01/13/2023 25.9 (L)  30.0 - 100.0 ng/ml Final   • Testosterone, Total 01/13/2023 126 (L)  264 - 916 ng/dL Final    Adult male reference interval is based on a population of  healthy nonobese males (BMI <30) between 19 and 39 years old.  Marilou et.al. JCEM 2017,102;8309-6619. PMID: [id number].   • Testosterone, Free 01/13/2023 5.9 (L)  9.3 - 26.5 pg/mL Final   • Vitamin B-12 01/13/2023 687  211 - 946 pg/mL Final   • Folate 01/13/2023 3.96 (L)  4.78 - 24.20 ng/mL Final   • Cortisol - AM 01/13/2023 7.23  6.02 - 18.40 mcg/dL Final   • DHEA-Sulfate 01/13/2023 344.0  138.5 - 475.2 ug/dL Final   • Glucose 01/13/2023 105 (H)  65 - 99 mg/dL Final   • BUN 01/13/2023 17  6 - 20 mg/dL Final   • Creatinine 01/13/2023 0.99  0.76 - 1.27 mg/dL Final   • Sodium 01/13/2023 135 (L)  136 - 145 mmol/L Final   • Potassium 01/13/2023 4.4  3.5 - 5.2 mmol/L Final   • Chloride 01/13/2023 100  98 - 107 mmol/L Final   • CO2 01/13/2023 24.2  22.0 - 29.0 mmol/L Final   • Calcium 01/13/2023 9.1  8.6 - 10.5 mg/dL Final   • Total Protein 01/13/2023 7.3  6.0 - 8.5 g/dL Final   • Albumin 01/13/2023 4.8  3.5 - 5.2 g/dL Final   • ALT (SGPT) 01/13/2023 80 (H)  1 - 41 U/L Final   • AST (SGOT) 01/13/2023 36  1 - 40 U/L Final   • Alkaline Phosphatase 01/13/2023 67  39 - 117 U/L Final   • Total Bilirubin 01/13/2023 0.4  0.0 - 1.2 mg/dL Final   • Globulin 01/13/2023 2.5  gm/dL Final   • A/G Ratio 01/13/2023 1.9  g/dL Final   • BUN/Creatinine Ratio 01/13/2023 17.2  7.0 - 25.0 Final   • Anion Gap 01/13/2023 10.8  5.0 - 15.0 mmol/L Final   • eGFR 01/13/2023 105.8  >60.0 mL/min/1.73 Final    National Kidney Foundation and American Society of Nephrology (ASN) Task Force recommended calculation based on the Chronic Kidney Disease Epidemiology Collaboration (CKD-EPI) equation refit without adjustment for race.   • Total Cholesterol 01/13/2023 210 (H)  0 - 200 mg/dL Final   • Triglycerides 01/13/2023 648 (H)  0 - 150 mg/dL Final   •  HDL Cholesterol 01/13/2023 24 (L)  40 - 60 mg/dL Final   • LDL Cholesterol  01/13/2023 81  0 - 100 mg/dL Final   • VLDL Cholesterol 01/13/2023 105 (H)  5 - 40 mg/dL Final   • LDL/HDL Ratio 01/13/2023 2.35   Final   • TSH 01/13/2023 4.880 (H)  0.270 - 4.200 uIU/mL Final   • Hemoglobin A1C 01/13/2023 5.80 (H)  4.80 - 5.60 % Final   • WBC 01/13/2023 6.69  3.40 - 10.80 10*3/mm3 Final   • RBC 01/13/2023 5.14  4.14 - 5.80 10*6/mm3 Final   • Hemoglobin 01/13/2023 14.8  13.0 - 17.7 g/dL Final   • Hematocrit 01/13/2023 42.9  37.5 - 51.0 % Final   • MCV 01/13/2023 83.5  79.0 - 97.0 fL Final   • MCH 01/13/2023 28.8  26.6 - 33.0 pg Final   • MCHC 01/13/2023 34.5  31.5 - 35.7 g/dL Final   • RDW 01/13/2023 13.6  12.3 - 15.4 % Final   • RDW-SD 01/13/2023 41.1  37.0 - 54.0 fl Final   • MPV 01/13/2023 10.6  6.0 - 12.0 fL Final   • Platelets 01/13/2023 253  140 - 450 10*3/mm3 Final   • Neutrophil % 01/13/2023 50.3  42.7 - 76.0 % Final   • Lymphocyte % 01/13/2023 35.0  19.6 - 45.3 % Final   • Monocyte % 01/13/2023 8.7  5.0 - 12.0 % Final   • Eosinophil % 01/13/2023 4.2  0.3 - 6.2 % Final   • Basophil % 01/13/2023 1.2  0.0 - 1.5 % Final   • Immature Grans % 01/13/2023 0.6 (H)  0.0 - 0.5 % Final   • Neutrophils, Absolute 01/13/2023 3.37  1.70 - 7.00 10*3/mm3 Final   • Lymphocytes, Absolute 01/13/2023 2.34  0.70 - 3.10 10*3/mm3 Final   • Monocytes, Absolute 01/13/2023 0.58  0.10 - 0.90 10*3/mm3 Final   • Eosinophils, Absolute 01/13/2023 0.28  0.00 - 0.40 10*3/mm3 Final   • Basophils, Absolute 01/13/2023 0.08  0.00 - 0.20 10*3/mm3 Final   • Immature Grans, Absolute 01/13/2023 0.04  0.00 - 0.05 10*3/mm3 Final   • nRBC 01/13/2023 0.0  0.0 - 0.2 /100 WBC Final   • Color, UA 01/13/2023 Yellow  Yellow, Straw Final   • Appearance, UA 01/13/2023 Clear  Clear Final   • pH, UA 01/13/2023 5.5  5.0 - 8.0 Final   • Specific Gravity, UA 01/13/2023 1.025  1.005 - 1.030 Final   • Glucose, UA 01/13/2023 Negative  Negative Final   • Ketones, UA 01/13/2023  Negative  Negative Final   • Bilirubin, UA 01/13/2023 Negative  Negative Final   • Blood, UA 01/13/2023 Negative  Negative Final   • Protein, UA 01/13/2023 Negative  Negative Final   • Leuk Esterase, UA 01/13/2023 Negative  Negative Final   • Nitrite, UA 01/13/2023 Negative  Negative Final   • Urobilinogen, UA 01/13/2023 0.2 E.U./dL  0.2 - 1.0 E.U./dL Final   • Microalbumin, Urine 01/13/2023 <1.2  mg/dL Final       EKG Results:  No orders to display       Imaging Results:  No Images in the past 120 days found..      Assessment & Plan   Diagnoses and all orders for this visit:    1. Major depressive disorder, recurrent episode, moderate (Primary)  -     citalopram (CeleXA) 20 MG tablet; Take 1.5 tablets by mouth Daily for 90 days.  Dispense: 45 tablet; Refill: 2    2. Generalized anxiety disorder      Presents with history of depression and anxiety. Increase citalopram to target depression and anxiety. 16 minutes of supportive psychotherapy with goal to strengthen defenses, promote problems solving, restore adaptive functioning and provide symptom relief. The therapeutic alliance was strengthened to encourage the patient to express their thoughts and feelings. Esteem building was enhanced through praise, reassurance, normalizing and encouragement. Coping skills were enhanced to build distress tolerance skills and emotional regulation. Allowed patient to freely discuss issues without interruption or judgement with unconditional positive regard, active listening skills, and empathy. Provided a safe, confidential environment to facilitate the development of a positive therapeutic relationship and encourage open, honest communication. Assisted patient in identifying risk factors which would indicate the need for higher level of care including thoughts to harm self or others and/or self-harming behavior and encouraged patient to contact this office, call 911, or present to the nearest emergency room should any of these  events occur. Assisted patient in processing session content; acknowledged and normalized patient's thoughts, feelings, and concerns by utilizing a person-centered approach in efforts to build appropriate rapport and a positive therapeutic relationship with open and honest communication. Patient given education on medication side effects, diagnosis/illness and relapse symptoms. Plan to continue supportive psychotherapy in next appointment to provide symptom relief. 4 weeks    Diagnoses: as above  Symptoms: as above  Functional status: good  Mental Status Exam: as above     Treatment plan: medication management and supportive psychotherapy  Prognosis: good  Progress: initial visit    Visit Diagnoses:    ICD-10-CM ICD-9-CM   1. Major depressive disorder, recurrent episode, moderate  F33.1 296.32   2. Generalized anxiety disorder  F41.1 300.02       PLAN:  1. Safety: No acute safety concerns.   2. Therapy: Declines  3. Risk Assessment: Risk of self-harm acutely is moderate.  Risk factors include anxiety disorder, mood disorder, and recent psychosocial stressors (pandemic). Protective factors include no family history, denies access to guns/weapons, no present SI, no history of suicide attempts or self-harm in the past, minimal AODA, healthcare seeking, future orientation, willingness to engage in care.  Risk of self-harm chronically is also moderate, but could be further elevated in the event of treatment noncompliance and/or AODA.  4. Medications: Increase citalopram 20mg to 30mg po qday to target depression and anxiety. Risks, benefits, alternatives discussed with patient including GI upset, nausea vomiting diarrhea, theoretical decrease of seizure threshold predisposing the patient to a slightly higher seizure risk, headaches, sexual dysfunction, serotonin syndrome, bleeding risk, increased suicidality in patients 24 years and younger, switching to yesy/hypomania.  After discussion of these risks and benefits, the  patient voiced understanding and agreed to proceed.  5. Labs/studies: No labs/studies ordered at this time  6. Follow-up: 4 weeks    Patient screened positive for depression based on a PHQ-9 score of  on . Follow-up recommendations include: Suicide Risk Assessment performed.         TREATMENT PLAN/GOALS: Continue supportive psychotherapy efforts and medications as indicated. Treatment and medication options discussed during today's visit. Patient ackowledged and verbally consented to continue with current treatment plan and was educated on the importance of compliance with treatment and follow-up appointments.      MEDICATION ISSUES:  WILIAN reviewed as expected.  Discussed medication options and treatment plan of prescribed medication as well as the risks, benefits, and side effects including potential falls, possible impaired driving and metabolic adversities among others. Patient is agreeable to call the office with any worsening of symptoms or onset of side effects. Patient is agreeable to call 911 or go to the nearest ER should he/she begin having SI/HI. No medication side effects or related complaints today.     MEDS ORDERED DURING VISIT:  New Medications Ordered This Visit   Medications   • citalopram (CeleXA) 20 MG tablet     Sig: Take 1.5 tablets by mouth Daily for 90 days.     Dispense:  45 tablet     Refill:  2       Return in about 4 weeks (around 6/16/2023) for Next scheduled follow up.         This document has been electronically signed by GEOVANNI Mariee  May 19, 2023 15:42 EDT      Part of this note may be an electronic transcription/translation of spoken language to printed text using the Dragon Dictation System.

## 2023-05-19 NOTE — TREATMENT PLAN
Multi-Disciplinary Problems (from Behavioral Health Treatment Plan)    Active Problems     Problem: Anxiety  Start Date: 05/19/23    Problem Details: The patient self-scales this problem as a 5 with 10 being the worst.        Goal Priority Start Date Expected End Date End Date    Patient will develop and implement behavioral and cognitive strategies to reduce anxiety and irrational fears. -- 05/19/23 -- --    Goal Details: Progress toward goal:  Not appropriate to rate progress toward goal since this is the initial treatment plan.        Goal Intervention Frequency Start Date End Date    Help patient explore past emotional issues in relation to present anxiety. Weekly 05/19/23 --    Intervention Details: Duration of treatment until until remission of symptoms.        Goal Intervention Frequency Start Date End Date    Help patient develop an awareness of their cognitive and physical responses to anxiety. Weekly 05/19/23 --    Intervention Details: Duration of treatment until until remission of symptoms.              Problem: Depression  Start Date: 05/19/23    Problem Details: The patient self-scales this problem as a 5 with 10 being the worst.        Goal Priority Start Date Expected End Date End Date    Patient will demonstrate the ability to initiate new constructive life skills outside of sessions on a consistent basis. -- 05/19/23 -- --    Goal Details: Progress toward goal:  Not appropriate to rate progress toward goal since this is the initial treatment plan.        Goal Intervention Frequency Start Date End Date    Assist patient in setting attainable activities of daily living goals. PRN 05/19/23 --    Goal Intervention Frequency Start Date End Date    Provide education about depression Weekly 05/19/23 --    Intervention Details: Duration of treatment until until remission of symptoms.        Goal Intervention Frequency Start Date End Date    Assist patient in developing healthy coping strategies. Weekly  05/19/23 --    Intervention Details: Duration of treatment until until remission of symptoms.                    Reviewed By     Sean Aceves APRN 05/19/23 1528                 I have discussed and reviewed this treatment plan with the patient.

## 2023-05-23 ENCOUNTER — HOSPITAL ENCOUNTER (OUTPATIENT)
Dept: SLEEP MEDICINE | Facility: HOSPITAL | Age: 28
End: 2023-05-23
Payer: COMMERCIAL

## 2023-05-23 DIAGNOSIS — E66.9 CLASS 2 OBESITY: ICD-10-CM

## 2023-05-23 DIAGNOSIS — G47.30 OBSERVED SLEEP APNEA: ICD-10-CM

## 2023-05-23 DIAGNOSIS — G47.8 NON-RESTORATIVE SLEEP: ICD-10-CM

## 2023-05-23 DIAGNOSIS — R06.83 SNORING: ICD-10-CM

## 2023-05-23 DIAGNOSIS — G47.10 HYPERSOMNIA: ICD-10-CM

## 2023-05-23 PROCEDURE — 95810 POLYSOM 6/> YRS 4/> PARAM: CPT

## 2023-05-23 PROCEDURE — 95810 POLYSOM 6/> YRS 4/> PARAM: CPT | Performed by: INTERNAL MEDICINE

## 2023-05-31 ENCOUNTER — LAB (OUTPATIENT)
Dept: LAB | Facility: HOSPITAL | Age: 28
End: 2023-05-31

## 2023-05-31 DIAGNOSIS — E29.1 3-OXO-5 ALPHA-STEROID DELTA 4-DEHYDROGENASE DEFICIENCY: ICD-10-CM

## 2023-05-31 LAB — TESTOST SERPL-MCNC: 680 NG/DL (ref 249–836)

## 2023-05-31 PROCEDURE — 36415 COLL VENOUS BLD VENIPUNCTURE: CPT

## 2023-05-31 PROCEDURE — 84403 ASSAY OF TOTAL TESTOSTERONE: CPT

## 2023-06-04 DIAGNOSIS — I10 BENIGN HYPERTENSION: ICD-10-CM

## 2023-06-04 DIAGNOSIS — G47.33 OSA (OBSTRUCTIVE SLEEP APNEA): Primary | ICD-10-CM

## 2023-06-04 DIAGNOSIS — R06.83 SNORING: ICD-10-CM

## 2023-06-07 ENCOUNTER — TELEPHONE (OUTPATIENT)
Dept: SLEEP MEDICINE | Facility: HOSPITAL | Age: 28
End: 2023-06-07
Payer: COMMERCIAL

## 2023-07-21 ENCOUNTER — LAB (OUTPATIENT)
Dept: LAB | Facility: HOSPITAL | Age: 28
End: 2023-07-21
Payer: COMMERCIAL

## 2023-07-21 DIAGNOSIS — E55.9 VITAMIN D DEFICIENCY: ICD-10-CM

## 2023-07-21 DIAGNOSIS — R53.83 OTHER FATIGUE: ICD-10-CM

## 2023-07-21 DIAGNOSIS — I10 ESSENTIAL (PRIMARY) HYPERTENSION: ICD-10-CM

## 2023-07-21 DIAGNOSIS — R73.9 HYPERGLYCEMIA: ICD-10-CM

## 2023-07-21 LAB
25(OH)D3 SERPL-MCNC: 24.3 NG/ML (ref 30–100)
ALBUMIN SERPL-MCNC: 4.7 G/DL (ref 3.5–5.2)
ALBUMIN/GLOB SERPL: 2.2 G/DL
ALP SERPL-CCNC: 59 U/L (ref 39–117)
ALT SERPL W P-5'-P-CCNC: 34 U/L (ref 1–41)
ANION GAP SERPL CALCULATED.3IONS-SCNC: 8 MMOL/L (ref 5–15)
AST SERPL-CCNC: 25 U/L (ref 1–40)
BILIRUB SERPL-MCNC: 0.3 MG/DL (ref 0–1.2)
BUN SERPL-MCNC: 17 MG/DL (ref 6–20)
BUN/CREAT SERPL: 14.3 (ref 7–25)
CALCIUM SPEC-SCNC: 9.5 MG/DL (ref 8.6–10.5)
CHLORIDE SERPL-SCNC: 104 MMOL/L (ref 98–107)
CHOLEST SERPL-MCNC: 183 MG/DL (ref 0–200)
CO2 SERPL-SCNC: 28 MMOL/L (ref 22–29)
CREAT SERPL-MCNC: 1.19 MG/DL (ref 0.76–1.27)
EGFRCR SERPLBLD CKD-EPI 2021: 84.8 ML/MIN/1.73
GLOBULIN UR ELPH-MCNC: 2.1 GM/DL
GLUCOSE SERPL-MCNC: 101 MG/DL (ref 65–99)
HBA1C MFR BLD: 5.9 % (ref 4.8–5.6)
HDLC SERPL-MCNC: 23 MG/DL (ref 40–60)
LDLC SERPL CALC-MCNC: 84 MG/DL (ref 0–100)
LDLC/HDLC SERPL: 2.89 {RATIO}
POTASSIUM SERPL-SCNC: 4.4 MMOL/L (ref 3.5–5.2)
PROT SERPL-MCNC: 6.8 G/DL (ref 6–8.5)
SODIUM SERPL-SCNC: 140 MMOL/L (ref 136–145)
TRIGL SERPL-MCNC: 468 MG/DL (ref 0–150)
VLDLC SERPL-MCNC: 76 MG/DL (ref 5–40)

## 2023-07-21 PROCEDURE — 84402 ASSAY OF FREE TESTOSTERONE: CPT

## 2023-07-21 PROCEDURE — 36415 COLL VENOUS BLD VENIPUNCTURE: CPT

## 2023-07-21 PROCEDURE — 83036 HEMOGLOBIN GLYCOSYLATED A1C: CPT

## 2023-07-21 PROCEDURE — 80061 LIPID PANEL: CPT

## 2023-07-21 PROCEDURE — 84403 ASSAY OF TOTAL TESTOSTERONE: CPT

## 2023-07-21 PROCEDURE — 82306 VITAMIN D 25 HYDROXY: CPT

## 2023-07-21 PROCEDURE — 80053 COMPREHEN METABOLIC PANEL: CPT

## 2023-07-30 LAB
TESTOST FREE SERPL-MCNC: 22.3 PG/ML (ref 9.3–26.5)
TESTOST SERPL-MCNC: 700 NG/DL (ref 264–916)

## 2023-08-23 ENCOUNTER — OFFICE VISIT (OUTPATIENT)
Dept: SLEEP MEDICINE | Facility: HOSPITAL | Age: 28
End: 2023-08-23
Payer: COMMERCIAL

## 2023-08-23 VITALS
SYSTOLIC BLOOD PRESSURE: 135 MMHG | HEART RATE: 80 BPM | WEIGHT: 260.4 LBS | DIASTOLIC BLOOD PRESSURE: 69 MMHG | OXYGEN SATURATION: 95 % | HEIGHT: 70 IN | BODY MASS INDEX: 37.28 KG/M2

## 2023-08-23 DIAGNOSIS — Z99.89 OSA ON CPAP: Primary | ICD-10-CM

## 2023-08-23 DIAGNOSIS — G47.33 OSA ON CPAP: Primary | ICD-10-CM

## 2023-08-23 DIAGNOSIS — E66.9 CLASS 2 OBESITY: ICD-10-CM

## 2023-08-23 PROBLEM — R06.83 SNORING: Status: RESOLVED | Noted: 2023-03-29 | Resolved: 2023-08-23

## 2023-08-23 PROBLEM — G47.10 HYPERSOMNIA: Status: RESOLVED | Noted: 2023-03-29 | Resolved: 2023-08-23

## 2023-08-23 PROBLEM — G47.8 NON-RESTORATIVE SLEEP: Status: RESOLVED | Noted: 2023-03-29 | Resolved: 2023-08-23

## 2023-08-23 PROCEDURE — G0463 HOSPITAL OUTPT CLINIC VISIT: HCPCS

## 2023-08-23 NOTE — PROGRESS NOTES
"  [address] [address]  Phone: [phone]  Fax: [phone]      SLEEP CLINIC FOLLOW UP PROGRESS NOTE.    Tae Mathews  [id number]   1995  28 y.o.  male      PCP: Harshad Grullon MD      Date of visit: 8/23/2023    Chief Complaint   Patient presents with    Sleep Apnea    Obesity       HPI:  This is a 28 y.o. years old patient is here for the management of obstructive sleep apnea.  Sleep apnea is mild in severity with a AHI of 10/hr. Patient is using positive airway pressure therapy with auto CPAP and the symptoms of sleep apnea have improved significantly on the therapy. Normally patient goes to bed at 11 PM and wakes up at 7 AM .  The patient wakes up 2-3 time(s) during the night and has no problem going back to sleep.  Feels refreshed after waking up.  He has a large leak due to facial hair    Medications and allergies are reviewed by me and documented in the encounter.     SOCIAL (habits pertaining to sleep medicine)  History tobacco use:No   History of alcohol use: 0 per week  Caffeine use: 2     REVIEW OF SYSTEMS:   Pertaining positive symptoms are:  [place] Sleepiness Scale :Total score: 7   Nasal congestion  Anxiety      PHYSICAL EXAMINATION:  CONSTITUTIONAL:  Vitals:    08/23/23 0900   BP: 135/69   Pulse: 80   SpO2: 95%   Weight: 118 kg (260 lb 6.4 oz)   Height: 177.8 cm (70\")    Body mass index is 37.36 kg/mý.   NOSE: nasal passages are clear, No deformities noted   RESP SYSTEM: Not in any respiratory distress, no chest deformities noted,   CARDIOVASULAR: No edema noted  NEURO: Oriented x 3, gait normal,  Mood and affect appeared appropriate      Data reviewed:  The Smart card downloaded on 8/23/2023 has been reviewed independently by me for compliance and discussed the data with the patient.   Compliance; 77%  More than 4 hr use, 75 %  Average use of the device 8 hours and 30 minutes per night  Residual AHI: 1.3 /hr (goal < 5.0 " /hr)  Large leak at 118 L  Mask type: Fullface mask  Device: ResMed  DME: [employer]  Benefit from AirTouch F20 fullface mask with memory foam due to facial hair      ASSESSMENT AND PLAN:  Obstructive sleep apnea ( G 47.33).  The symptoms of sleep apnea have improved with the device and the treatment.  Patient's compliance with the device is excellent for treatment of sleep apnea.  I have independently reviewed the smart card down load and discussed with the patient the download data and encouarged the patient to continue to use the device.The residual AHI is acceptable. The device is benefiting the patient and the device is medically necessary.  Without proper control of sleep apnea and good compliance there is a increased risk for hypertension, diabetes mellitus and nonrestorative sleep with hypersomnia which can increase risk for motor vehicle accidents.  Untreated sleep apnea is also a risk factor for development of atrial fibrillation, pulmonary hypertension, insulin resistance and stroke. The patient is also instructed to get the supplies from the DME company and and change them on a regular basis.  A prescription for supplies has been sent to the DME company.  I have also discussed the good sleep hygiene habits and adequate amount of sleep needed for good health.  Obesity  2 with BMI is Body mass index is 37.36 kg/mý.. I have discuss the relationship between the weight and sleep apnea. The benefit of weight loss in reducing severity of sleep apnea was discussed. Discussed diet and exercise with the patient to achieve ideal BMI.   Return in about 1 year (around 8/23/2024) for with smart card down load. . Patient's questions were answered.    8/23/2023  Autumn Hinson MD  Sleep Medicine.  Medical Director,   [facility], [facility].

## 2024-01-17 ENCOUNTER — TRANSCRIBE ORDERS (OUTPATIENT)
Dept: LAB | Facility: HOSPITAL | Age: 29
End: 2024-01-17
Payer: COMMERCIAL

## 2024-01-17 DIAGNOSIS — I10 ESSENTIAL (PRIMARY) HYPERTENSION: ICD-10-CM

## 2024-01-17 DIAGNOSIS — R53.83 OTHER FATIGUE: ICD-10-CM

## 2024-01-17 DIAGNOSIS — E03.9 HYPOTHYROIDISM, UNSPECIFIED TYPE: ICD-10-CM

## 2024-01-17 DIAGNOSIS — R73.9 HYPERGLYCEMIA: Primary | ICD-10-CM

## 2024-01-23 ENCOUNTER — TRANSCRIBE ORDERS (OUTPATIENT)
Dept: ADMINISTRATIVE | Facility: HOSPITAL | Age: 29
End: 2024-01-23
Payer: COMMERCIAL

## 2024-01-23 DIAGNOSIS — N46.9 MALE INFERTILITY, UNSPECIFIED: Primary | ICD-10-CM

## 2024-06-08 ENCOUNTER — TELEPHONE (OUTPATIENT)
Dept: SLEEP MEDICINE | Facility: HOSPITAL | Age: 29
End: 2024-06-08
Payer: COMMERCIAL

## 2024-06-08 NOTE — TELEPHONE ENCOUNTER
Left message for patient to schedule annual follow up visit at [facility] at [phone], option 1 to schedule.

## 2025-01-23 ENCOUNTER — TRANSCRIBE ORDERS (OUTPATIENT)
Dept: LAB | Facility: HOSPITAL | Age: 30
End: 2025-01-23
Payer: COMMERCIAL

## 2025-01-23 DIAGNOSIS — R73.9 HYPERGLYCEMIA: ICD-10-CM

## 2025-01-23 DIAGNOSIS — I10 HYPERTENSION, ESSENTIAL: ICD-10-CM

## 2025-01-23 DIAGNOSIS — E55.9 VITAMIN D DEFICIENCY: Primary | ICD-10-CM

## 2025-01-23 DIAGNOSIS — R53.83 OTHER FATIGUE: ICD-10-CM

## 2025-02-18 ENCOUNTER — LAB (OUTPATIENT)
Facility: HOSPITAL | Age: 30
End: 2025-02-18
Payer: COMMERCIAL

## 2025-02-18 DIAGNOSIS — E55.9 VITAMIN D DEFICIENCY: ICD-10-CM

## 2025-02-18 DIAGNOSIS — R73.9 HYPERGLYCEMIA: ICD-10-CM

## 2025-02-18 DIAGNOSIS — I10 HYPERTENSION, ESSENTIAL: ICD-10-CM

## 2025-02-18 DIAGNOSIS — R53.83 OTHER FATIGUE: ICD-10-CM

## 2025-02-18 LAB
25(OH)D3 SERPL-MCNC: 48.3 NG/ML (ref 30–100)
ALBUMIN SERPL-MCNC: 4.2 G/DL (ref 3.5–5.2)
ALBUMIN UR-MCNC: 1.2 MG/DL
ALBUMIN/GLOB SERPL: 1.6 G/DL
ALP SERPL-CCNC: 77 U/L (ref 39–117)
ALT SERPL W P-5'-P-CCNC: 73 U/L (ref 1–41)
ANION GAP SERPL CALCULATED.3IONS-SCNC: 12.3 MMOL/L (ref 5–15)
AST SERPL-CCNC: 44 U/L (ref 1–40)
BASOPHILS # BLD AUTO: 0.09 10*3/MM3 (ref 0–0.2)
BASOPHILS NFR BLD AUTO: 1.3 % (ref 0–1.5)
BILIRUB SERPL-MCNC: 0.4 MG/DL (ref 0–1.2)
BILIRUB UR QL STRIP: NEGATIVE
BUN SERPL-MCNC: 15 MG/DL (ref 6–20)
BUN/CREAT SERPL: 11.9 (ref 7–25)
CALCIUM SPEC-SCNC: 9 MG/DL (ref 8.6–10.5)
CHLORIDE SERPL-SCNC: 105 MMOL/L (ref 98–107)
CHOLEST SERPL-MCNC: 204 MG/DL (ref 0–200)
CLARITY UR: CLEAR
CO2 SERPL-SCNC: 21.7 MMOL/L (ref 22–29)
COLOR UR: YELLOW
CREAT SERPL-MCNC: 1.26 MG/DL (ref 0.76–1.27)
DEPRECATED RDW RBC AUTO: 44.1 FL (ref 37–54)
EGFRCR SERPLBLD CKD-EPI 2021: 78.2 ML/MIN/1.73
EOSINOPHIL # BLD AUTO: 0.32 10*3/MM3 (ref 0–0.4)
EOSINOPHIL NFR BLD AUTO: 4.8 % (ref 0.3–6.2)
ERYTHROCYTE [DISTWIDTH] IN BLOOD BY AUTOMATED COUNT: 14.1 % (ref 12.3–15.4)
FOLATE SERPL-MCNC: 7.82 NG/ML (ref 4.78–24.2)
GLOBULIN UR ELPH-MCNC: 2.6 GM/DL
GLUCOSE SERPL-MCNC: 113 MG/DL (ref 65–99)
GLUCOSE UR STRIP-MCNC: NEGATIVE MG/DL
HBA1C MFR BLD: 5.7 % (ref 4.8–5.6)
HCT VFR BLD AUTO: 45.6 % (ref 37.5–51)
HDLC SERPL-MCNC: 20 MG/DL (ref 40–60)
HGB BLD-MCNC: 15.5 G/DL (ref 13–17.7)
HGB UR QL STRIP.AUTO: NEGATIVE
IMM GRANULOCYTES # BLD AUTO: 0.04 10*3/MM3 (ref 0–0.05)
IMM GRANULOCYTES NFR BLD AUTO: 0.6 % (ref 0–0.5)
KETONES UR QL STRIP: NEGATIVE
LDLC SERPL CALC-MCNC: 69 MG/DL (ref 0–100)
LDLC/HDLC SERPL: 1.77 {RATIO}
LEUKOCYTE ESTERASE UR QL STRIP.AUTO: NEGATIVE
LYMPHOCYTES # BLD AUTO: 2.71 10*3/MM3 (ref 0.7–3.1)
LYMPHOCYTES NFR BLD AUTO: 40.4 % (ref 19.6–45.3)
MCH RBC QN AUTO: 29.2 PG (ref 26.6–33)
MCHC RBC AUTO-ENTMCNC: 34 G/DL (ref 31.5–35.7)
MCV RBC AUTO: 86 FL (ref 79–97)
MONOCYTES # BLD AUTO: 0.55 10*3/MM3 (ref 0.1–0.9)
MONOCYTES NFR BLD AUTO: 8.2 % (ref 5–12)
NEUTROPHILS NFR BLD AUTO: 3 10*3/MM3 (ref 1.7–7)
NEUTROPHILS NFR BLD AUTO: 44.7 % (ref 42.7–76)
NITRITE UR QL STRIP: NEGATIVE
NRBC BLD AUTO-RTO: 0 /100 WBC (ref 0–0.2)
PH UR STRIP.AUTO: 5.5 [PH] (ref 5–8)
PLATELET # BLD AUTO: 238 10*3/MM3 (ref 140–450)
PMV BLD AUTO: 10.7 FL (ref 6–12)
POTASSIUM SERPL-SCNC: 4.3 MMOL/L (ref 3.5–5.2)
PROT SERPL-MCNC: 6.8 G/DL (ref 6–8.5)
PROT UR QL STRIP: NEGATIVE
RBC # BLD AUTO: 5.3 10*6/MM3 (ref 4.14–5.8)
SODIUM SERPL-SCNC: 139 MMOL/L (ref 136–145)
SP GR UR STRIP: 1.02 (ref 1–1.03)
TRIGL SERPL-MCNC: 743 MG/DL (ref 0–150)
TSH SERPL DL<=0.05 MIU/L-ACNC: 11.8 UIU/ML (ref 0.27–4.2)
UROBILINOGEN UR QL STRIP: NORMAL
VIT B12 BLD-MCNC: 752 PG/ML (ref 211–946)
VLDLC SERPL-MCNC: 115 MG/DL (ref 5–40)
WBC NRBC COR # BLD AUTO: 6.71 10*3/MM3 (ref 3.4–10.8)

## 2025-02-18 PROCEDURE — 82607 VITAMIN B-12: CPT

## 2025-02-18 PROCEDURE — 84402 ASSAY OF FREE TESTOSTERONE: CPT

## 2025-02-18 PROCEDURE — 80050 GENERAL HEALTH PANEL: CPT

## 2025-02-18 PROCEDURE — 82306 VITAMIN D 25 HYDROXY: CPT

## 2025-02-18 PROCEDURE — 82746 ASSAY OF FOLIC ACID SERUM: CPT

## 2025-02-18 PROCEDURE — 84403 ASSAY OF TOTAL TESTOSTERONE: CPT

## 2025-02-18 PROCEDURE — 80061 LIPID PANEL: CPT

## 2025-02-18 PROCEDURE — 83036 HEMOGLOBIN GLYCOSYLATED A1C: CPT

## 2025-02-18 PROCEDURE — 81003 URINALYSIS AUTO W/O SCOPE: CPT

## 2025-02-18 PROCEDURE — 36415 COLL VENOUS BLD VENIPUNCTURE: CPT

## 2025-02-18 PROCEDURE — 82043 UR ALBUMIN QUANTITATIVE: CPT

## 2025-02-24 LAB
TESTOST FREE SERPL-MCNC: 20.5 PG/ML (ref 8.7–25.1)
TESTOST SERPL-MCNC: 415 NG/DL (ref 264–916)

## 2025-03-04 NOTE — PROGRESS NOTES
"[facility]  Initial Evaluation    Referring Provider:  Harshad Grullon MD  [address]  [address]  [place] [address]    Chief Complaint: \"referred for medication management\"     History of Present Illness: Tae Mathews is a guarded 29 y.o. male who presents today for initial evaluation regarding psychiatric consultation. Tae presents unaccompanied in no acute distress and engages with me appropriately. Psychotropic regimen with which patient presents is described as bupropion  mg po q day, and Viibryd 20 mg po q day.      History is positive for signs/symptoms suggestive of low mood, low energy, anhedonia, changes in sleep, changes in appetite, guilt, poor concentration, psychomotor changes, thoughts of being better off dead. \"My best friend committed suicide-it jump started my maturation,  made my life heavy and real..grandfather committed suicide 5 years ago. \"I would be ok if I got run over by a car.\"     \"The pandemic time created some time for self reflection\"-he voices that he might have spent too much time on reflection. He voices that he does not necessarily think that medication could help him not think about certain dark thoughts. \"My thoughts live in the front of my mind.\" He voices having difficulty releasing some intrusive thoughts. \"I am an over thinker and a ruminator.\"   He reports having vivid dreams that he wakes from and he has to do a reality check. He sees all his faults and struggles with accepting those.   He voices that there is less emotional blunting with Viibryd but describes that he is experiencing feelings that can be jarring at time.      Psychiatric screening is negative for pathognomonic history of: yesy, psychosis, violence, TBI, seizures.  Reduce Viibryd to 10 mg po q am, continue bupropion  mg po q day.   In regard to risk, at this time patient appears to not have plan, intent, or means. He is cooperative, and is " "future oriented. Patient was somewhat guarded during the assessment leaving my diagnostic picture to be a bit unclear. Patient did endorse periods of childhood trauma as well adult trauma. At this time I cannot rule out C-PTSD or PTSD, more evaluation is needed to make these distinctions. Considered and offered prazosin for treatment of his sleep disturbances. Ignacio declined, but agreed to report any changes in their intensity.   I have counseled the patient with regard to diagnoses and the recommended treatment regimen as documented below: I will assume prescriptive responsibility for bupropion  mg po am.  Patient acknowledges the diagnoses per my rendered interpretation. Patient demonstrates awareness/understanding of viable alternatives for treatment as well as potential risks, benefits, and side effects associated with this regimen and is amenable to proceed in this fashion.     Recommended lifestyle changes: 30 minutes of activity to increase HR 2-3 days weekly.    Psychiatric History:  Diagnoses: depression, anxiety  Outpatient history: in the past sought therapy   Inpatient history: none  Medication trials: citalopram (sexual side effects), Pristiq (foggy)  sertraline, Vraylar-\"yesy\"   Other treatment modalities: Psychotherapy  Self harm: No history  Suicide attempts: No  Abuse or neglect: None    Substance Use History:   Types/methods/frequency: *none  Transtheoretical stage: none    Social History:  Residence: lives house, my wife, and cats  Vocation: working, marching band composer   Source of income: Employed  Last grade completed: college, [employer] [place]  Pertinent developmental history: some thyroid issue in youth   Pertinent legal history: No history   Hobbies/interests: [place] [employer], Marching band, [facility] [employer]   Sikhism: L[employer]-day   Exercise: none  Dietary habits: none  Sleep hygiene: c-pap, well tolerated, 5-10 times to re-adjust, vivid dreaming enhanced by Viibryd  Social habits: " feels well supported   Sunlight: There are no concern for under-exposure.  Caffeine intake: daily but in moderation-about 125 mg daily  Hydration habits: no pertinent issues    history: No    Social History     Socioeconomic History    Marital status:    Tobacco Use    Smoking status: Never    Smokeless tobacco: Never   Vaping Use    Vaping status: Never Used   Substance and Sexual Activity    Alcohol use: Yes     Comment: SOCIAL     Drug use: Never    Sexual activity: Yes     Partners: Female     Access to Firearms: no    Tobacco use counseling/intervention: N/A, patient does not use tobacco  PHQ-9 Depression Screening  PHQ-9 Total Score: 11    Little interest or pleasure in doing things? Several days   Feeling down, depressed, or hopeless? Over half   PHQ-2 Total Score 3   Trouble falling or staying asleep, or sleeping too much? Several days   Feeling tired or having little energy? Over half   Poor appetite or overeating? Not at all   Feeling bad about yourself - or that you are a failure or have let yourself or your family down? Over half   Trouble concentrating on things, such as reading the newspaper or watching television? Several days   Moving or speaking so slowly that other people could have noticed? Or the opposite - being so fidgety or restless that you have been moving around a lot more than usual? Not at all   Thoughts that you would be better off dead, or of hurting yourself in some way? Over half   PHQ-9 Total Score 11   If you checked off any problems, how difficult have these problems made it for you to do your work, take care of things at home, or get along with other people? Somewhat difficult       PEPE-7  Feeling nervous, anxious or on edge: More than half the days  Not being able to stop or control worrying: More than half the days  Worrying too much about different things: More than half the days  Trouble Relaxing: Not at all  Being so restless that it is hard to sit still: Not  at all  Feeling afraid as if something awful might happen: More than half the days  Becoming easily annoyed or irritable: Nearly every day  PEPE 7 Total Score: 11  If you checked any problems, how difficult have these problems made it for you to do your work, take care of things at home, or get along with other people: Somewhat difficult    Problem List:  Patient Active Problem List   Diagnosis    CYDNEY on CPAP    Class 2 obesity    Anxiety    Hyperlipidemia    High blood pressure    Hypothyroidism     Allergy:   Allergies   Allergen Reactions    Vancomycin Other (See Comments)     Joint pain    Augmentin [Amoxicillin-Pot Clavulanate] Rash        Discontinued Medications:  Medications Discontinued During This Encounter   Medication Reason    cetirizine (zyrTEC) 10 MG tablet *Therapy completed    Vascepa 1 g capsule capsule *Therapy completed    citalopram (CeleXA) 20 MG tablet *Therapy completed    phentermine 15 MG capsule *Therapy completed    Vraylar 1.5 MG capsule capsule *Therapy completed    vilazodone (VIIBRYD) 10 MG tablet tablet *Therapy completed    sertraline (ZOLOFT) 50 MG tablet *Therapy completed    desvenlafaxine (PRISTIQ) 50 MG 24 hr tablet *Therapy completed    buPROPion SR (Wellbutrin SR) 100 MG 12 hr tablet *Therapy completed       Current Medications:   Current Outpatient Medications   Medication Sig Dispense Refill    buPROPion XL (WELLBUTRIN XL) 150 MG 24 hr tablet Take 1 tablet by mouth Daily.      clomiPHENE (Clomid) 50 MG tablet       fenofibrate 160 MG tablet       fexofenadine (Allegra Allergy) 180 MG tablet       levothyroxine (SYNTHROID, LEVOTHROID) 200 MCG tablet Take 1 tablet by mouth Daily.      levothyroxine (SYNTHROID, LEVOTHROID) 25 MCG tablet       lisinopril (PRINIVIL,ZESTRIL) 20 MG tablet Take 1 tablet by mouth Daily.      Viibryd 20 MG tablet tablet Take 1 tablet by mouth Daily.       No current facility-administered medications for this visit.     Past Medical History:  Past  "Medical History:   Diagnosis Date    Acid reflux     Allergies     Anxiety     Depression     Disease of thyroid gland     Hypertension      Past Surgical History:  Past Surgical History:   Procedure Laterality Date    COLONOSCOPY  11/03/2021     Family History:   Family History   Problem Relation Age of Onset    Anxiety disorder Mother     Depression Mother     Cancer Father     No Known Problems Sister     No Known Problems Brother     No Known Problems Maternal Aunt     No Known Problems Paternal Aunt     No Known Problems Maternal Uncle     No Known Problems Paternal Uncle     Suicide Attempts Maternal Grandfather         committed suicide, Veteran's Day 2020    Anxiety disorder Maternal Grandmother     Dementia Maternal Grandmother     Depression Maternal Grandmother     Seizures Maternal Grandmother     No Known Problems Paternal Grandfather     No Known Problems Paternal Grandmother     No Known Problems Cousin     No Known Problems Other     ADD / ADHD Neg Hx     Alcohol abuse Neg Hx     Bipolar disorder Neg Hx     Drug abuse Neg Hx     OCD Neg Hx     Paranoid behavior Neg Hx     Schizophrenia Neg Hx     Self-Injurious Behavior  Neg Hx        Mental Status Exam:   Observations:  Appearance: Neat  Speech: Normal  Eye Contact: Normal  Motor Activity: Normal  Affect:Full  Comments:  Mood:Mood: Euthymic  Cognition  Orientation Impairment: None  Memory Impairment: None  Attention: Normal  Comments:  Perception  Hallucinations:None  Other:   Comments:  Thoughts:  Suicidality:None  Homicidality:None  Delusions:  None  Comments:  Behavior:Behavior: Cooperative  Insight: Insight: Good  Judgement:Insight: Good    Vital Signs:   /83   Pulse 94   Ht 175.3 cm (69\")   BMI 38.45 kg/m²    Lab Results:   Lab on 02/18/2025   Component Date Value Ref Range Status    25 Hydroxy, Vitamin D 02/18/2025 48.3  30.0 - 100.0 ng/ml Final    Testosterone, Total 02/18/2025 415  264 - 916 ng/dL Final    Adult male reference " interval is based on a population of  healthy nonobese males (BMI <30) between 19 and 39 years old.  Marilou et.al. JCEM 2017,102;1596-8146. PMID: [id number].    Testosterone, Free 02/18/2025 20.5  8.7 - 25.1 pg/mL Final    Vitamin B-12 02/18/2025 752  211 - 946 pg/mL Final    Folate 02/18/2025 7.82  4.78 - 24.20 ng/mL Final    Color, UA 02/18/2025 Yellow  Yellow, Straw Final    Appearance, UA 02/18/2025 Clear  Clear Final    pH, UA 02/18/2025 5.5  5.0 - 8.0 Final    Specific Gravity, UA 02/18/2025 1.025  1.005 - 1.030 Final    Glucose, UA 02/18/2025 Negative  Negative Final    Ketones, UA 02/18/2025 Negative  Negative Final    Bilirubin, UA 02/18/2025 Negative  Negative Final    Blood, UA 02/18/2025 Negative  Negative Final    Protein, UA 02/18/2025 Negative  Negative Final    Leuk Esterase, UA 02/18/2025 Negative  Negative Final    Nitrite, UA 02/18/2025 Negative  Negative Final    Urobilinogen, UA 02/18/2025 0.2 E.U./dL  0.2 - 1.0 E.U./dL Final    Glucose 02/18/2025 113 (H)  65 - 99 mg/dL Final    BUN 02/18/2025 15  6 - 20 mg/dL Final    Creatinine 02/18/2025 1.26  0.76 - 1.27 mg/dL Final    Sodium 02/18/2025 139  136 - 145 mmol/L Final    Potassium 02/18/2025 4.3  3.5 - 5.2 mmol/L Final    Slight hemolysis detected by analyzer. Result may be falsely elevated.    Chloride 02/18/2025 105  98 - 107 mmol/L Final    CO2 02/18/2025 21.7 (L)  22.0 - 29.0 mmol/L Final    Calcium 02/18/2025 9.0  8.6 - 10.5 mg/dL Final    Total Protein 02/18/2025 6.8  6.0 - 8.5 g/dL Final    Albumin 02/18/2025 4.2  3.5 - 5.2 g/dL Final    ALT (SGPT) 02/18/2025 73 (H)  1 - 41 U/L Final    AST (SGOT) 02/18/2025 44 (H)  1 - 40 U/L Final    Slight hemolysis detected by analyzer. Result may be falsely elevated.    Alkaline Phosphatase 02/18/2025 77  39 - 117 U/L Final    Total Bilirubin 02/18/2025 0.4  0.0 - 1.2 mg/dL Final    Globulin 02/18/2025 2.6  gm/dL Final    A/G Ratio 02/18/2025 1.6  g/dL Final    BUN/Creatinine Ratio 02/18/2025 11.9   7.0 - 25.0 Final    Anion Gap 02/18/2025 12.3  5.0 - 15.0 mmol/L Final    eGFR 02/18/2025 78.2  >60.0 mL/min/1.73 Final    Total Cholesterol 02/18/2025 204 (H)  0 - 200 mg/dL Final    Triglycerides 02/18/2025 743 (H)  0 - 150 mg/dL Final    HDL Cholesterol 02/18/2025 20 (L)  40 - 60 mg/dL Final    LDL Cholesterol  02/18/2025 69  0 - 100 mg/dL Final    VLDL Cholesterol 02/18/2025 115 (H)  5 - 40 mg/dL Final    LDL/HDL Ratio 02/18/2025 1.77   Final    TSH 02/18/2025 11.800 (H)  0.270 - 4.200 uIU/mL Final    Microalbumin, Urine 02/18/2025 1.2  mg/dL Final    Hemoglobin A1C 02/18/2025 5.70 (H)  4.80 - 5.60 % Final    WBC 02/18/2025 6.71  3.40 - 10.80 10*3/mm3 Final    RBC 02/18/2025 5.30  4.14 - 5.80 10*6/mm3 Final    Hemoglobin 02/18/2025 15.5  13.0 - 17.7 g/dL Final    Hematocrit 02/18/2025 45.6  37.5 - 51.0 % Final    MCV 02/18/2025 86.0  79.0 - 97.0 fL Final    MCH 02/18/2025 29.2  26.6 - 33.0 pg Final    MCHC 02/18/2025 34.0  31.5 - 35.7 g/dL Final    RDW 02/18/2025 14.1  12.3 - 15.4 % Final    RDW-SD 02/18/2025 44.1  37.0 - 54.0 fl Final    MPV 02/18/2025 10.7  6.0 - 12.0 fL Final    Platelets 02/18/2025 238  140 - 450 10*3/mm3 Final    Neutrophil % 02/18/2025 44.7  42.7 - 76.0 % Final    Lymphocyte % 02/18/2025 40.4  19.6 - 45.3 % Final    Monocyte % 02/18/2025 8.2  5.0 - 12.0 % Final    Eosinophil % 02/18/2025 4.8  0.3 - 6.2 % Final    Basophil % 02/18/2025 1.3  0.0 - 1.5 % Final    Immature Grans % 02/18/2025 0.6 (H)  0.0 - 0.5 % Final    Neutrophils, Absolute 02/18/2025 3.00  1.70 - 7.00 10*3/mm3 Final    Lymphocytes, Absolute 02/18/2025 2.71  0.70 - 3.10 10*3/mm3 Final    Monocytes, Absolute 02/18/2025 0.55  0.10 - 0.90 10*3/mm3 Final    Eosinophils, Absolute 02/18/2025 0.32  0.00 - 0.40 10*3/mm3 Final    Basophils, Absolute 02/18/2025 0.09  0.00 - 0.20 10*3/mm3 Final    Immature Grans, Absolute 02/18/2025 0.04  0.00 - 0.05 10*3/mm3 Final    nRBC 02/18/2025 0.0  0.0 - 0.2 /100 WBC Final       ASSESSMENT AND  PLAN:  No diagnosis found.    Tae who presents today for initial evaluation regarding psychiatric evaluation.  We have discussed the history and interpreted diagnoses as above as well as the treatment plan below, including potential R/B/SE of the recommended regimen of which the patient demonstrates understanding. Patient is agreeable to call 911 or go to the nearest ER should he become concerned for his own safety and/or the safety of those around him. There are not overt indices of acute yesy/psychosis on evaluation today.     Medication regimen: start Vilazodone 10 mg po q day,continue bupropion Xl 150 mg po q day ; patient is advised not to misuse prescribed medications or to use any exogenous substances that aren't disclosed to this provider as they may interact with the regimen to his detriment.   Risk Assessment: protracted risk is moderate, imminent risk is moderate. Risk factors include: psychotic disorder, anxiety disorder, mood disorder, and recent/ongoing psychosocial stressors. Protective factors include: intact reality testing, no substance use disorder, no access to firearms, no present SI, no stated history of suicide attempts or self-harm, patient's exhibited future-orientation, strong social support, and patient's cooperation with care. Do note that this is subject to change with the [facility] of new stressors, treatment non-adherence, use of substances, and/or new medical ails.  Monitoring: reviewed labs/imaging as populated above, PHQ-9 today is PHQ-9 Total Score: 11 /27, PEPE-7 today is 11/21  Therapy: information provided for Dr. Ricardo Zhang as he does not accept referrals from out practice  Follow-up: 4/17/2025  Communications: N/A    TREATMENT PLAN/GOALS: challenge patterns of living conducive to symptom burden, implement recommended regimen as above with augmentative, intermittent supportive psychotherapy to reduce symptom burden. Patient acknowledged and verbally consented to begin  treatment as above. The importance of adherence to the recommended treatment and interval follow-up appointments was emphasized today. Patient was today advised to limit daily caffeine intake, hydrate appropriately, eat healthy and nutritious foods, engage sleep hygiene measures, engage appropriate exposure to sunlight, engage with hobbies in balance with life necessities, and exercise appropriate to their capacity to do so.     Billing: I have seen the patient today and considered his psychiatric complaints, rendered a diagnosis, and discussed treatment with the patient as above with which he consents.    Parts of this note are electronic transcriptions/translations of spoken language to printed text using the Dragon Dictation system.    Electronically signed by GEOVANNI Fulton, 03/04/25,

## 2025-03-06 ENCOUNTER — OFFICE VISIT (OUTPATIENT)
Dept: PSYCHIATRY | Facility: CLINIC | Age: 30
End: 2025-03-06
Payer: COMMERCIAL

## 2025-03-06 VITALS
DIASTOLIC BLOOD PRESSURE: 83 MMHG | BODY MASS INDEX: 38.45 KG/M2 | HEIGHT: 69 IN | SYSTOLIC BLOOD PRESSURE: 136 MMHG | HEART RATE: 94 BPM

## 2025-03-06 DIAGNOSIS — F33.1 MAJOR DEPRESSIVE DISORDER, RECURRENT EPISODE, MODERATE: Primary | ICD-10-CM

## 2025-03-06 DIAGNOSIS — F41.1 GENERALIZED ANXIETY DISORDER: ICD-10-CM

## 2025-03-06 RX ORDER — VILAZODONE HYDROCHLORIDE 20 MG/1
1 TABLET ORAL DAILY
COMMUNITY
Start: 2025-01-24 | End: 2025-03-06 | Stop reason: DRUGHIGH

## 2025-03-06 RX ORDER — VILAZODONE HYDROCHLORIDE 10 MG/1
TABLET ORAL
COMMUNITY
Start: 2025-01-23 | End: 2025-03-06

## 2025-03-06 RX ORDER — CARIPRAZINE 1.5 MG/1
CAPSULE, GELATIN COATED ORAL
COMMUNITY
Start: 2024-12-02 | End: 2025-03-06

## 2025-03-06 RX ORDER — VILAZODONE HYDROCHLORIDE 10 MG/1
10 TABLET ORAL DAILY
Qty: 30 TABLET | Refills: 2 | Status: SHIPPED | OUTPATIENT
Start: 2025-03-06 | End: 2025-06-04

## 2025-03-06 RX ORDER — BUPROPION HYDROCHLORIDE 100 MG/1
TABLET, EXTENDED RELEASE ORAL
COMMUNITY
Start: 2024-12-26 | End: 2025-03-06

## 2025-03-06 RX ORDER — FENOFIBRATE 160 MG/1
TABLET ORAL
COMMUNITY
Start: 2025-02-20

## 2025-03-06 RX ORDER — BUPROPION HYDROCHLORIDE 150 MG/1
1 TABLET ORAL DAILY
COMMUNITY

## 2025-03-06 RX ORDER — FEXOFENADINE HCL 180 MG/1
TABLET ORAL
COMMUNITY
Start: 2025-01-01

## 2025-03-06 RX ORDER — DESVENLAFAXINE 50 MG/1
TABLET, FILM COATED, EXTENDED RELEASE ORAL
COMMUNITY
Start: 2024-12-05 | End: 2025-03-06

## 2025-03-07 NOTE — TREATMENT PLAN
Multi-Disciplinary Problems (from Behavioral Health Treatment Plan)      Active Problems       Problem: Anxiety  Start Date: 03/07/25      Problem Details: The patient self-scales this problem as a 3 with 10 being the worst.          Goal Priority Start Date Expected End Date End Date    Patient will develop and implement behavioral and cognitive strategies to reduce anxiety and irrational fears. -- 03/07/25 09/05/25 --    Goal Details: Progress toward goal:  The patient self-scales their progress related to this goal as a 3 with 10 being the worst.          Goal Intervention Frequency Start Date End Date    Help patient explore past emotional issues in relation to present anxiety. Weekly 03/07/25 --    Intervention Details: Duration of treatment until remission of symptoms.          Goal Intervention Frequency Start Date End Date    Help patient develop an awareness of their cognitive and physical responses to anxiety. Weekly 03/07/25 --    Intervention Details: Duration of treatment until remission of symptoms.                          Reviewed By       Lizbeth Beebe APRN 03/07/25 [id number]                     I have discussed and reviewed this treatment plan with the patient.

## 2025-06-27 ENCOUNTER — TRANSCRIBE ORDERS (OUTPATIENT)
Dept: ADMINISTRATIVE | Facility: HOSPITAL | Age: 30
End: 2025-06-27
Payer: COMMERCIAL

## 2025-06-27 DIAGNOSIS — E03.9 HYPOTHYROIDISM, UNSPECIFIED TYPE: Primary | ICD-10-CM
